# Patient Record
Sex: FEMALE | Race: WHITE | NOT HISPANIC OR LATINO | Employment: FULL TIME | URBAN - METROPOLITAN AREA
[De-identification: names, ages, dates, MRNs, and addresses within clinical notes are randomized per-mention and may not be internally consistent; named-entity substitution may affect disease eponyms.]

---

## 2017-06-19 ENCOUNTER — ALLSCRIPTS OFFICE VISIT (OUTPATIENT)
Dept: OTHER | Facility: OTHER | Age: 37
End: 2017-06-19

## 2017-06-27 ENCOUNTER — LAB CONVERSION - ENCOUNTER (OUTPATIENT)
Dept: OTHER | Facility: OTHER | Age: 37
End: 2017-06-27

## 2017-06-27 LAB
ADDITIONAL INFORMATION (HISTORICAL): NORMAL
ADEQUACY: (HISTORICAL): NORMAL
COMMENT (HISTORICAL): NORMAL
CYTOTECHNOLOGIST: (HISTORICAL): NORMAL
HPV MRNA E6/E7 (HISTORICAL): NOT DETECTED
INTERPRETATION (HISTORICAL): NORMAL
LMP (HISTORICAL): NORMAL
PREV. BX: (HISTORICAL): NORMAL
PREV. PAP (HISTORICAL): NORMAL
REVIEWED BY (HISTORICAL): NORMAL
SOURCE (HISTORICAL): NORMAL

## 2018-01-13 VITALS
SYSTOLIC BLOOD PRESSURE: 130 MMHG | HEIGHT: 64 IN | DIASTOLIC BLOOD PRESSURE: 90 MMHG | BODY MASS INDEX: 30.05 KG/M2 | WEIGHT: 176 LBS

## 2018-08-01 ENCOUNTER — ANNUAL EXAM (OUTPATIENT)
Dept: OBGYN CLINIC | Facility: CLINIC | Age: 38
End: 2018-08-01
Payer: COMMERCIAL

## 2018-08-01 VITALS
SYSTOLIC BLOOD PRESSURE: 140 MMHG | WEIGHT: 194.8 LBS | BODY MASS INDEX: 33.26 KG/M2 | DIASTOLIC BLOOD PRESSURE: 90 MMHG | HEIGHT: 64 IN

## 2018-08-01 DIAGNOSIS — Z01.419 WOMEN'S ANNUAL ROUTINE GYNECOLOGICAL EXAMINATION: Primary | ICD-10-CM

## 2018-08-01 DIAGNOSIS — Z12.39 BREAST CANCER SCREENING: ICD-10-CM

## 2018-08-01 PROCEDURE — 99396 PREV VISIT EST AGE 40-64: CPT | Performed by: OBSTETRICS & GYNECOLOGY

## 2018-08-01 RX ORDER — OMEPRAZOLE 40 MG/1
40 CAPSULE, DELAYED RELEASE ORAL
Refills: 0 | COMMUNITY
Start: 2018-07-08

## 2018-08-01 RX ORDER — CETIRIZINE HYDROCHLORIDE 10 MG/1
TABLET ORAL
COMMUNITY

## 2018-08-01 RX ORDER — ALBUTEROL SULFATE 2.5 MG/3ML
SOLUTION RESPIRATORY (INHALATION)
Refills: 1 | COMMUNITY
Start: 2018-07-07

## 2018-08-01 NOTE — PATIENT INSTRUCTIONS
The patient was informed of a stable gyn examination  Because of a history of severe dysplasia a Pap smear was repeated  She should make arrangements for mammogram   She should return my office 1 year

## 2018-08-01 NOTE — PROGRESS NOTES
This is a 47-year-old white female, she is a  2 para 2 with 2 prior vaginal deliveries  Her current method of contraception includes vasectomy  Her menstrual cycles are regular predictable  She is approximately 3-1/2 years status post cone biopsy for severe dysplasia of the cervix  Her subsequent Pap smears have been normal   She will need a Pap smear today  She is also being treated for recurrent bronchitis  She has been treated with steroids as been weight gain  Her blood pressures also be slightly elevated 140/90  Other medication she is taking a regular basis is Prilosec and zyrtec  The patient denies any problem with depression or anxiety  She sees a dentist on a regular basis  She is concerned about the weight gain prior secondary to steroids  There are no new major family illnesses report this time  Review of systems negative  There is some slight stress incontinence secondary to recurrent bronchitis  Social history negative for tobacco positive for social alcohol      Medical condition significant for recurrent bronchitis  History of GERD,        Surgical history significant for cone biopsy of the cervix in the year       Family history is noncontributory it is positive for hypertension the      Physical exam      This is a well-developed well-nourished female acute distress HEENT is was within normal limits  Cardiac exam shows a regular rhythm and rate normal S1-S2 lungs are clear A&P  There is no evidence bronchitis today  Breast exam symmetrical nontender no masses no dimpling or retraction  Axilla clear bilaterally  Abdominal exam softer no masses positive bowel sounds  Pelvic exam the external genitalia normal limits vagina is clean the uterus is retroverted normal size the cervix is parous  A Pap smear was per performed because of prior history being severe dysplasia  Adnexa clear bilaterally  Impression stable gyn examination    History of severe dysplasia of the cervix  Pap smear was repeated  About the patient started her family physician for borderline blood pressure is 1 40/90  If this Pap smear is normal she return to office in 1 year    She will make arrangements for screening mammogram

## 2018-08-07 LAB
CYTOLOGIST CVX/VAG CYTO: NORMAL
DX ICD CODE: NORMAL
HPV I/H RISK 1 DNA CVX QL PROBE+SIG AMP: NEGATIVE
OTHER STN SPEC: NORMAL
PATH REPORT.FINAL DX SPEC: NORMAL
SL AMB NOTE:: NORMAL
SL AMB SPECIMEN ADEQUACY: NORMAL

## 2019-10-08 ENCOUNTER — ANNUAL EXAM (OUTPATIENT)
Dept: OBGYN CLINIC | Facility: CLINIC | Age: 39
End: 2019-10-08
Payer: COMMERCIAL

## 2019-10-08 VITALS
DIASTOLIC BLOOD PRESSURE: 84 MMHG | WEIGHT: 194.4 LBS | SYSTOLIC BLOOD PRESSURE: 126 MMHG | BODY MASS INDEX: 33.19 KG/M2 | HEIGHT: 64 IN

## 2019-10-08 DIAGNOSIS — Z01.419 WOMEN'S ANNUAL ROUTINE GYNECOLOGICAL EXAMINATION: ICD-10-CM

## 2019-10-08 DIAGNOSIS — Z12.39 BREAST CANCER SCREENING: Primary | ICD-10-CM

## 2019-10-08 PROCEDURE — 99396 PREV VISIT EST AGE 40-64: CPT | Performed by: OBSTETRICS & GYNECOLOGY

## 2019-10-08 RX ORDER — DEXAMETHASONE 4 MG/1
TABLET ORAL
Refills: 3 | COMMUNITY
Start: 2019-07-12

## 2019-10-08 NOTE — PROGRESS NOTES
Assessment/Plan:    The patient was informed of a stable gyn examination  A Pap smear was performed  She does have a history of severe dysplasia in the past   She will make arrangements for mammogram   Return to office in 1 year  She will continue to exercise and lose more weight  Subjective:      Patient ID: Prisca Dick is a 37 y o  female  HPI    This is a 47-year-old white female, she is a  2 para 2 with 2 prior vaginal delivery  She has a history of severe dysplasia of the cervix status post cone biopsy approximately 4 years ago  Her current method of contraception includes vasectomy  Her menstrual cycles are regular and predictable  Does no particular problem with intimacy  She is not happy with her weight  She is to make arrange for mammogram   Denies any problem depression or anxiety  She has a dentist on a regular basis  There are no new major family illnesses report at this time  The following portions of the patient's history were reviewed and updated as appropriate: allergies, current medications, past family history, past medical history, past social history, past surgical history and problem list     Review of Systems   All other systems reviewed and are negative  Objective:      /84   Ht 5' 4" (1 626 m)   Wt 88 2 kg (194 lb 6 4 oz)   LMP 2019 (Approximate)   Breastfeeding? No   BMI 33 37 kg/m²          Physical Exam   Constitutional: She appears well-developed and well-nourished  HENT:   Head: Normocephalic and atraumatic  Eyes: EOM are normal    Neck: Normal range of motion  Neck supple  Cardiovascular: Normal rate, regular rhythm and normal heart sounds  Pulmonary/Chest: Effort normal and breath sounds normal  No stridor  No respiratory distress  She has no wheezes  Right breast exhibits no inverted nipple, no mass, no nipple discharge, no skin change and no tenderness   Left breast exhibits no inverted nipple, no mass, no nipple discharge, no skin change and no tenderness  No breast swelling, tenderness, discharge or bleeding  Breasts are symmetrical    Abdominal: Soft  Bowel sounds are normal  Hernia confirmed negative in the right inguinal area and confirmed negative in the left inguinal area  Genitourinary: Rectum normal, vagina normal and uterus normal  Pelvic exam was performed with patient prone  No labial fusion  There is no rash, tenderness, lesion or injury on the right labia  There is no rash, tenderness, lesion or injury on the left labia  Cervix exhibits no motion tenderness, no discharge and no friability  Right adnexum displays no mass, no tenderness and no fullness  Left adnexum displays no mass, no tenderness and no fullness  No erythema, tenderness or bleeding in the vagina  No foreign body in the vagina  No signs of injury around the vagina  No vaginal discharge found  Genitourinary Comments:  Pap smear was performed  Cervix is consistent with prior cone biopsy of the cervix  Musculoskeletal: Normal range of motion  Lymphadenopathy: No inguinal adenopathy noted on the right or left side  Neurological: She is alert  Skin: Skin is warm and dry  Capillary refill takes less than 2 seconds  Psychiatric: She has a normal mood and affect   Her behavior is normal

## 2019-10-08 NOTE — PATIENT INSTRUCTIONS
Patient was informed of a stable gyn examination  A Pap smear was performed  She does have a history of severe dysplasia  She will continue to try to exercise and lose more weight  She has no other particular gynecological  GI complaint  Return to office in 1 year    She will make arrangements for a mammogram

## 2019-10-11 LAB
CLINICAL INFO: NORMAL
CYTO CVX: NORMAL
CYTOLOGY CMNT CVX/VAG CYTO-IMP: NORMAL
DATE PREVIOUS BX: NORMAL
HPV E6+E7 MRNA CVX QL NAA+PROBE: NOT DETECTED
LMP START DATE: NORMAL
SL AMB PREV. PAP:: NORMAL
SPECIMEN SOURCE CVX/VAG CYTO: NORMAL

## 2021-01-11 ENCOUNTER — ANNUAL EXAM (OUTPATIENT)
Dept: OBGYN CLINIC | Facility: CLINIC | Age: 41
End: 2021-01-11
Payer: COMMERCIAL

## 2021-01-11 VITALS
SYSTOLIC BLOOD PRESSURE: 114 MMHG | HEIGHT: 64 IN | BODY MASS INDEX: 31.65 KG/M2 | WEIGHT: 185.4 LBS | DIASTOLIC BLOOD PRESSURE: 76 MMHG

## 2021-01-11 DIAGNOSIS — Z12.31 ENCOUNTER FOR SCREENING MAMMOGRAM FOR MALIGNANT NEOPLASM OF BREAST: ICD-10-CM

## 2021-01-11 DIAGNOSIS — Z01.419 WOMEN'S ANNUAL ROUTINE GYNECOLOGICAL EXAMINATION: Primary | ICD-10-CM

## 2021-01-11 PROCEDURE — 99396 PREV VISIT EST AGE 40-64: CPT | Performed by: OBSTETRICS & GYNECOLOGY

## 2021-01-11 NOTE — PATIENT INSTRUCTIONS
The patient was informed of a stable gyn examination  A Pap smear was performed  She will continue to get yearly mammograms  Return my office in 1 year

## 2021-01-11 NOTE — PROGRESS NOTES
Assessment/Plan:    The external genitalia normal limits the vagina is clean the patient was informed of a stable gyn examination  A Pap smear was performed because of prior history of JOSE JUAN 3  History of colon biopsy  Otherwise doing well  To continue getting mammograms  She feels safe at home  She has a dentist on a regular basis  She should return my office in 1 year unless this Pap smear is abnormal          Subjective:      Patient ID: Olvin Welch is a 41 y o  female  HPI    This is a 40-year-old white female, she is a  2 para 2 with 2 prior vaginal deliveries  Her current method of contraception includes vasectomy  Her menstrual cycles are regular and predictable  She is still sexually active without issues  She denies any major gynecological  GI complaint  Her mammograms are up-to-date  She would like to lose more weight  Denies any problem with depression or anxiety  She feels safe at home  She is dealing well with COVID  There are no new major family illnesses report at this time  She has a dentist on a regular basis  She is not a smoker  The following portions of the patient's history were reviewed and updated as appropriate: allergies, current medications, past family history, past medical history, past social history, past surgical history and problem list     Review of Systems   All other systems reviewed and are negative  Objective:      /76   Ht 5' 4" (1 626 m)   Wt 84 1 kg (185 lb 6 4 oz)   LMP 2020 (Exact Date)   BMI 31 82 kg/m²          Physical Exam  Vitals signs reviewed  Exam conducted with a chaperone present  Constitutional:       Appearance: Normal appearance  She is normal weight  HENT:      Head: Normocephalic and atraumatic  Eyes:      Extraocular Movements: Extraocular movements intact  Neck:      Musculoskeletal: Normal range of motion and neck supple     Cardiovascular:      Rate and Rhythm: Normal rate and regular rhythm  Pulses: Normal pulses  Heart sounds: Normal heart sounds  Pulmonary:      Effort: Pulmonary effort is normal       Breath sounds: Normal breath sounds  Chest:      Breasts:         Right: Normal  No swelling, bleeding, inverted nipple, mass, nipple discharge or skin change  Left: Normal  No swelling, bleeding, inverted nipple, mass, nipple discharge or skin change  Abdominal:      General: Abdomen is flat  There is no distension  Palpations: Abdomen is soft  There is no hepatomegaly, splenomegaly or mass  Tenderness: There is no abdominal tenderness  There is no right CVA tenderness or left CVA tenderness  Hernia: No hernia is present  There is no hernia in the left inguinal area or right inguinal area  Genitourinary:     General: Normal vulva  Pubic Area: No rash or pubic lice  Labia:         Right: No rash, tenderness, lesion or injury  Left: No rash, tenderness, lesion or injury  Urethra: No prolapse, urethral pain or urethral lesion  Vagina: Normal       Cervix: Normal       Uterus: Normal  Not deviated, not enlarged, not fixed, not tender and no uterine prolapse  Adnexa: Right adnexa normal and left adnexa normal         Right: No mass, tenderness or fullness  Left: No mass, tenderness or fullness  Rectum: Normal  Guaiac result negative  Comments: The external genitalia normal limits the vagina was clean  The cervix is parous but closed  The uterus is anterior normal size there is no cervical motion tenderness  There is no prolapse  The adnexa clear  The cervix shows evidence of prior cone biopsy  A Pap smear was performed  The urethra was normal   There is no prolapse of the bladder  Musculoskeletal: Normal range of motion  Lymphadenopathy:      Upper Body:      Right upper body: No supraclavicular or axillary adenopathy  Left upper body: No supraclavicular or axillary adenopathy        Lower Body: No right inguinal adenopathy  No left inguinal adenopathy  Skin:     General: Skin is warm and dry  Neurological:      General: No focal deficit present  Mental Status: She is alert and oriented to person, place, and time  Psychiatric:         Mood and Affect: Mood normal          Behavior: Behavior normal          Thought Content:  Thought content normal

## 2021-04-08 DIAGNOSIS — Z23 ENCOUNTER FOR IMMUNIZATION: ICD-10-CM

## 2022-02-02 ENCOUNTER — ANNUAL EXAM (OUTPATIENT)
Dept: OBGYN CLINIC | Facility: CLINIC | Age: 42
End: 2022-02-02
Payer: COMMERCIAL

## 2022-02-02 VITALS
BODY MASS INDEX: 33.63 KG/M2 | HEIGHT: 64 IN | WEIGHT: 197 LBS | SYSTOLIC BLOOD PRESSURE: 130 MMHG | DIASTOLIC BLOOD PRESSURE: 86 MMHG

## 2022-02-02 DIAGNOSIS — Z12.31 ENCOUNTER FOR SCREENING MAMMOGRAM FOR MALIGNANT NEOPLASM OF BREAST: ICD-10-CM

## 2022-02-02 DIAGNOSIS — Z01.419 WOMEN'S ANNUAL ROUTINE GYNECOLOGICAL EXAMINATION: Primary | ICD-10-CM

## 2022-02-02 PROCEDURE — 99396 PREV VISIT EST AGE 40-64: CPT | Performed by: OBSTETRICS & GYNECOLOGY

## 2022-02-02 NOTE — PROGRESS NOTES
Assessment/Plan:    The patient was informed of a stable gyn examination  Because of a history of JOSE JUAN 3 a Pap smear was performed  She will continue to monitor her menstrual cycles  She she will continue get yearly mammograms  She is to complete her booster vaccine for COVID  She should return my office in 1 year unless any new changes of significance  Subjective:      Patient ID: Darlyn Dan is a 35 y o  female  HPI    This is a 49-year-old white female, she is a  2 para 2 with 2 prior vaginal deliveries  Her current method of contraception includes vasectomy  Her menstrual cycles are mostly regular predictable  There is no problem with intimacy  She is not happy with her weight  She has a dentist on a regular basis  She feels safe at home  She has gotten 1st dose of COVID vaccine  She has a history of JOSE JUAN 3 is many years status post cone biopsy  Her last 2 Pap smears have been normal   She feels safe at home  There are no new major family illnesses report at this time  She does have a history of asthma which is treated medically and under control  She has been diagnosed with reflux  The following portions of the patient's history were reviewed and updated as appropriate: allergies, current medications, past family history, past medical history, past social history, past surgical history and problem list     Review of Systems   All other systems reviewed and are negative  Objective:      /86   Ht 5' 4" (1 626 m)   Wt 89 4 kg (197 lb)   LMP 2022   BMI 33 81 kg/m²          Physical Exam  Vitals reviewed  Exam conducted with a chaperone present  Constitutional:       Appearance: Normal appearance  HENT:      Head: Normocephalic and atraumatic  Eyes:      Extraocular Movements: Extraocular movements intact  Cardiovascular:      Rate and Rhythm: Normal rate and regular rhythm  Pulses: Normal pulses  Heart sounds: Normal heart sounds  Pulmonary:      Effort: Pulmonary effort is normal  No respiratory distress  Breath sounds: Normal breath sounds  No stridor  No wheezing or rales  Chest:   Breasts:      Right: Normal  No swelling, bleeding, inverted nipple, mass, nipple discharge, skin change, tenderness, axillary adenopathy or supraclavicular adenopathy  Left: No swelling, bleeding, inverted nipple, mass, nipple discharge, skin change, tenderness, axillary adenopathy or supraclavicular adenopathy  Abdominal:      General: Abdomen is flat  Bowel sounds are normal  There is no distension  Palpations: Abdomen is soft  There is no hepatomegaly, splenomegaly or mass  Tenderness: There is no abdominal tenderness  There is no guarding or rebound  Hernia: No hernia is present  There is no hernia in the umbilical area, ventral area, left inguinal area or right inguinal area  Genitourinary:     General: Normal vulva  Pubic Area: No rash or pubic lice  Labia:         Right: No rash, tenderness or lesion  Left: No rash, tenderness or lesion  Urethra: No prolapse, urethral pain, urethral swelling or urethral lesion  Vagina: Normal  No signs of injury and foreign body  No vaginal discharge, erythema, tenderness, bleeding, lesions or prolapsed vaginal walls  Cervix: Normal       Uterus: Normal  Not tender  Adnexa: Right adnexa normal and left adnexa normal         Right: No mass, tenderness or fullness  Left: No mass, tenderness or fullness  Rectum: Normal       Comments: The external genitalia normal limits the vagina is clean the cervix is parous but closed  The cervix is still well status post cone biopsy many years ago  A Pap smear was performed  She has no prolapse of the uterus is anterior no cervical motion tenderness  The there is no prolapse of the bladder or the urethra  Musculoskeletal:         General: Normal range of motion        Cervical back: Normal range of motion and neck supple  Lymphadenopathy:      Upper Body:      Right upper body: No supraclavicular or axillary adenopathy  Left upper body: No supraclavicular or axillary adenopathy  Lower Body: No right inguinal adenopathy  No left inguinal adenopathy  Skin:     General: Skin is warm and dry  Neurological:      General: No focal deficit present  Mental Status: She is alert and oriented to person, place, and time  Psychiatric:         Mood and Affect: Mood normal          Behavior: Behavior normal          Thought Content:  Thought content normal

## 2022-02-02 NOTE — PATIENT INSTRUCTIONS
The patient was informed of a stable gyn examination  She does have a history of JOSE JUAN 3 she is many years status post call biopsy  A Pap smear was performed today  She will make arrangements to get her her booster vaccine for COVID  She will continue get yearly mammograms  She will be able to continue to watch her weight  She should return my office in 1 year unless new problems arise

## 2023-02-07 ENCOUNTER — ANNUAL EXAM (OUTPATIENT)
Dept: OBGYN CLINIC | Facility: CLINIC | Age: 43
End: 2023-02-07

## 2023-02-07 VITALS
HEIGHT: 64 IN | WEIGHT: 196.8 LBS | BODY MASS INDEX: 33.6 KG/M2 | DIASTOLIC BLOOD PRESSURE: 84 MMHG | SYSTOLIC BLOOD PRESSURE: 120 MMHG

## 2023-02-07 DIAGNOSIS — Z01.419 WOMEN'S ANNUAL ROUTINE GYNECOLOGICAL EXAMINATION: ICD-10-CM

## 2023-02-07 DIAGNOSIS — Z12.31 ENCOUNTER FOR SCREENING MAMMOGRAM FOR MALIGNANT NEOPLASM OF BREAST: ICD-10-CM

## 2023-02-07 DIAGNOSIS — N92.0 MENORRHAGIA WITH REGULAR CYCLE: Primary | ICD-10-CM

## 2023-02-07 RX ORDER — TRANEXAMIC ACID 650 MG/1
TABLET ORAL
Qty: 30 TABLET | Refills: 3 | Status: SHIPPED | OUTPATIENT
Start: 2023-02-07

## 2023-02-07 NOTE — PATIENT INSTRUCTIONS
The patient was informed of a stable GYN examination except for slight uterine enlargement possible fibroids and heavy menstrual cycles  Make arrangements for an ultrasound  We will start her on Lysteda to take 5 days each menstrual cycle  She will keep an eye on her anemia  She will call me in 2 months with a progress report  I will inform of results of the ultrasound  She will continue to yearly mammograms  She should return to my office in 1 year everything else is stable

## 2023-02-07 NOTE — PROGRESS NOTES
Assessment/Plan:    The patient was informed of a stable GYN examination  She has a history of JOSE JUAN-3 status post cone biopsy  She also has a history of regular menorrhagia with her menstrual cycles  We will now start her on Lysteda 2 tablets 3 times a day beginning the first day of the menstrual cycle  She will call me in 2 months with a progress report of her bleeding pattern  She has been followed by primary care physician for anemia hopefully to be improvement in anemia  If this is not effective we may consider the IUD  We will also make arrangements for an ultrasound looking for fibroids  We will continue to get yearly mammograms  Start colorectal screening after her 40th birthday  She is content with her weight but she is trying to lose more  She sees a dentist on a regular basis  She feels safe at home  There is no problem with depression or anxiety  Subjective:      Patient ID: Shaan Dowd is a 38 y o  female  HPI  This is a 70-year-old white female, she is a  2 para 2 with 2 prior vaginal deliveries  Her current method of contraception includes vasectomy  Her menstrual cycles are regular and being heavy  Her primary care physician is concerned about increasing problems with anemia with her iron levels dropping  She was instructed to talking about her heavy menstrual flow  After discussion we will start her on Lysteda for 3 months to see if we can diminish the amount of bleeding  She will keep informed of her progress  She is not happy with her weight  She feels safe at home  There is no problem with intimacy  She sees a dentist on a regular basis  She will continue to get yearly mammograms  Her primary care physician spoke to her about starting colorectal evaluation after her 43th birthday  There are no new major family illnesses to report        The following portions of the patient's history were reviewed and updated as appropriate: allergies, current medications, past family history, past medical history, past social history, past surgical history and problem list     Review of Systems   HENT: Negative for sneezing  Genitourinary: Positive for menstrual problem  All other systems reviewed and are negative  Objective:      /84   Ht 5' 4" (1 626 m)   Wt 89 3 kg (196 lb 12 8 oz)   LMP 01/10/2023   BMI 33 78 kg/m²          Physical Exam  Vitals reviewed  Constitutional:       Appearance: Normal appearance  She is normal weight  HENT:      Head: Normocephalic and atraumatic  Nose: Nose normal       Mouth/Throat:      Mouth: Mucous membranes are moist    Eyes:      Extraocular Movements: Extraocular movements intact  Pupils: Pupils are equal, round, and reactive to light  Cardiovascular:      Rate and Rhythm: Normal rate and regular rhythm  Pulmonary:      Effort: Pulmonary effort is normal    Chest:   Breasts:     Breasts are symmetrical       Right: Normal  No swelling, bleeding, inverted nipple, mass, nipple discharge, skin change or tenderness  Left: Normal  No swelling, bleeding, inverted nipple, mass, nipple discharge, skin change or tenderness  Abdominal:      General: Abdomen is flat  Bowel sounds are normal  There is no distension  Palpations: Abdomen is soft  There is no hepatomegaly, splenomegaly or mass  Tenderness: There is no abdominal tenderness  There is no guarding or rebound  Hernia: No hernia is present  There is no hernia in the umbilical area, ventral area, left inguinal area or right inguinal area  Genitourinary:     General: Normal vulva  Pubic Area: No rash or pubic lice  Labia:         Right: No rash, tenderness, lesion or injury  Left: No rash, tenderness, lesion or injury  Urethra: No prolapse, urethral pain, urethral swelling or urethral lesion  Vagina: Normal  No signs of injury and foreign body   No vaginal discharge, erythema, tenderness, bleeding, lesions or prolapsed vaginal walls  Cervix: Normal       Uterus: Normal  Enlarged  Not deviated, not fixed, not tender and no uterine prolapse  Adnexa: Right adnexa normal and left adnexa normal         Right: No mass, tenderness or fullness  Left: No mass, tenderness or fullness  Rectum: Normal       Comments: The external genitalia normal limits the vagina is clean the uterus is top normal size possible fibroid there is no cervical motion tenderness the adnexa clear bilaterally  A Pap smear was performed  There is no evidence of prolapse  The urethra and bladder normal working relationship  Musculoskeletal:         General: Normal range of motion  Cervical back: Normal range of motion and neck supple  Lymphadenopathy:      Upper Body:      Right upper body: No supraclavicular adenopathy  Left upper body: No supraclavicular adenopathy  Lower Body: No right inguinal adenopathy  No left inguinal adenopathy  Skin:     General: Skin is warm and dry  Neurological:      General: No focal deficit present  Mental Status: She is alert and oriented to person, place, and time  Psychiatric:         Mood and Affect: Mood normal          Behavior: Behavior normal          Thought Content:  Thought content normal

## 2024-02-15 ENCOUNTER — ANNUAL EXAM (OUTPATIENT)
Dept: OBGYN CLINIC | Facility: CLINIC | Age: 44
End: 2024-02-15
Payer: COMMERCIAL

## 2024-02-15 VITALS
BODY MASS INDEX: 36.54 KG/M2 | DIASTOLIC BLOOD PRESSURE: 76 MMHG | WEIGHT: 214 LBS | HEIGHT: 64 IN | SYSTOLIC BLOOD PRESSURE: 116 MMHG

## 2024-02-15 DIAGNOSIS — Z12.31 ENCOUNTER FOR SCREENING MAMMOGRAM FOR BREAST CANCER: ICD-10-CM

## 2024-02-15 DIAGNOSIS — Z01.419 WOMEN'S ANNUAL ROUTINE GYNECOLOGICAL EXAMINATION: Primary | ICD-10-CM

## 2024-02-15 PROCEDURE — 99396 PREV VISIT EST AGE 40-64: CPT | Performed by: OBSTETRICS & GYNECOLOGY

## 2024-02-15 RX ORDER — RIMEGEPANT SULFATE 75 MG/75MG
75 TABLET, ORALLY DISINTEGRATING ORAL ONCE
COMMUNITY

## 2024-02-15 RX ORDER — PYRIDOXINE HCL (VITAMIN B6) 100 MG
TABLET ORAL
COMMUNITY

## 2024-02-15 NOTE — PROGRESS NOTES
"Assessment/Plan:    Patient was informed of a stable GYN examination.  A Pap smear was not performed.  She is going to work on her weight.  She sees a dentist on a regular basis.  She feels safe at home.  We will do a Pap smear every other year with her history of JOSE JUAN-3 in the past.  Her last 6 years of Pap smears have been normal.  There are no new major family illnesses to report.  She should return to my office in 1 year.  She has a colonoscopy scheduled for later this year.  She will continue to get yearly mammograms.         Subjective:      Patient ID: Cate Christie is a 43 y.o. female.    HPI    This is a 43-year-old white female, she is a  2 para 2 with 2 prior vaginal deliveries.  Her current method of contraception includes vasectomy.  There is no problem with intimacy.  Her menstrual cycles are not as heavy.  And regular.  Her anemia has improved.  She is not happy with her weight.  She may consider professional help.  Her vital signs are stable.  Currently she is being treated for his asthma and headaches.  She is also taking Prilosec.  There are no new major family illnesses to report.  The patient has a history of JOSE JUAN-3 in the past she is many years status post cone biopsy.  She has had yearly Pap smears in the last 6 years and all been normal.  We will now go to every other year with her Pap smears.  She is in the same relationship for many years.  Her mammogram is up-to-date.  Her colonoscopy is scheduled for later this year.        The following portions of the patient's history were reviewed and updated as appropriate: allergies, current medications, past family history, past medical history, past social history, past surgical history, and problem list.    Review of Systems   All other systems reviewed and are negative.        Objective:      /76   Ht 5' 4\" (1.626 m)   Wt 97.1 kg (214 lb)   LMP 2024 (Exact Date)   BMI 36.73 kg/m²          Physical Exam  Vitals reviewed. " Exam conducted with a chaperone present.   Constitutional:       Appearance: Normal appearance.   HENT:      Head: Normocephalic and atraumatic.      Nose: Nose normal.   Eyes:      Pupils: Pupils are equal, round, and reactive to light.   Cardiovascular:      Rate and Rhythm: Normal rate and regular rhythm.   Pulmonary:      Effort: Pulmonary effort is normal.      Breath sounds: Normal breath sounds.   Chest:   Breasts:     Breasts are symmetrical.      Right: Normal.      Left: Normal.   Abdominal:      General: Abdomen is flat. Bowel sounds are normal.      Palpations: Abdomen is soft. There is no hepatomegaly or splenomegaly.      Hernia: No hernia is present. There is no hernia in the left inguinal area or right inguinal area.   Genitourinary:     General: Normal vulva.      Pubic Area: No rash or pubic lice.       Labia:         Right: No rash, tenderness, lesion or injury.         Left: No rash, tenderness, lesion or injury.       Urethra: No prolapse, urethral pain, urethral swelling or urethral lesion.      Vagina: Normal. No signs of injury and foreign body. No vaginal discharge, erythema, tenderness, bleeding, lesions or prolapsed vaginal walls.      Cervix: Normal.      Uterus: Normal.       Adnexa: Right adnexa normal and left adnexa normal.      Rectum: Normal.      Comments: The external genitalia normal limits the vagina is clean.  The cervix parous but closed.  There is no cervical motion tenderness.  A Pap smear was not done this year.  There is no evidence of prolapse.  The urethra and bladder normal working relationship.  Musculoskeletal:         General: Normal range of motion.      Cervical back: Normal range of motion and neck supple.   Lymphadenopathy:      Upper Body:      Right upper body: No supraclavicular adenopathy.      Left upper body: No supraclavicular adenopathy.   Skin:     General: Skin is warm.   Neurological:      General: No focal deficit present.      Mental Status: She is  alert.   Psychiatric:         Mood and Affect: Mood normal.

## 2024-02-15 NOTE — PATIENT INSTRUCTIONS
The patient was informed of a stable GYN examination.  A Pap smear was not performed.  She is cleared for colonoscopy this year.  She will continue get yearly mammograms.  She is considering professional help with weight loss.  She should return to my office in 1 year.  She should get a Pap smear every other year

## 2025-04-22 ENCOUNTER — ANNUAL EXAM (OUTPATIENT)
Dept: OBGYN CLINIC | Facility: CLINIC | Age: 45
End: 2025-04-22
Payer: COMMERCIAL

## 2025-04-22 VITALS — SYSTOLIC BLOOD PRESSURE: 122 MMHG | DIASTOLIC BLOOD PRESSURE: 74 MMHG | BODY MASS INDEX: 29.35 KG/M2 | WEIGHT: 171 LBS

## 2025-04-22 DIAGNOSIS — Z12.31 ENCOUNTER FOR SCREENING MAMMOGRAM FOR BREAST CANCER: ICD-10-CM

## 2025-04-22 DIAGNOSIS — Z01.419 WOMEN'S ANNUAL ROUTINE GYNECOLOGICAL EXAMINATION: Primary | ICD-10-CM

## 2025-04-22 PROCEDURE — 99396 PREV VISIT EST AGE 40-64: CPT | Performed by: OBSTETRICS & GYNECOLOGY

## 2025-04-22 RX ORDER — TIRZEPATIDE 2.5 MG/.5ML
INJECTION, SOLUTION SUBCUTANEOUS
COMMUNITY
Start: 2025-04-07

## 2025-04-22 RX ORDER — PROPRANOLOL HCL 20 MG
20 TABLET ORAL DAILY PRN
COMMUNITY
Start: 2025-03-27

## 2025-04-22 NOTE — PROGRESS NOTES
Name: Cate Christie      : 1980      MRN: [id number]  Encounter Provider: Derek Benton MD  Encounter Date: 2025   Encounter department: [facility]  :  Assessment & Plan  Encounter for screening mammogram for breast cancer    Orders:    Mammo screening bilateral w 3d and cad; Future    Women's annual routine gynecological examination  The patient was informed of a stable GYN examination.  A Pap smear was performed.  She is happy with the way.  She feels safe at home.  She sees a dentist on a regular basis.  Is no prior depression or anxiety.  Colonoscopies and mammograms up-to-date.           History of Present Illness   HPI  Cate Christie is a 44 y.o. female who presents for her yearly exam.  She is a  2 para 2 with 2 prior vaginal deliveries.  Her current method of contraception includes a vasectomy.  Her menstrual cycles are regular and predictable.  She feels safe at home.  She is content with her weight.  She did use semaglutide's and has lost over 30 pounds.  She is currently on Zepbound.  This is working well.  There is no problem with intimacy.  She will continue getting her yearly mammograms.  Colonoscopy is up-to-date she goes every 5 years.  She has a history of severe dysplasia of the cervix underwent a cone biopsy of cervix in the year .  Her subsequent Pap smears to be normal she will do a Pap smear today.  Denies any prior depression or anxiety.  Her PHQ score today is 0.  There is no major  or GI complaint.  There are no new major family illnesses to report.  She will continue to self breast examinations.      Review of Systems   All other systems reviewed and are negative.         Objective   /74   Wt 77.6 kg (171 lb)   LMP 2025 (Approximate)   BMI 29.35 kg/m²      Physical Exam  Vitals reviewed. Exam conducted with a chaperone present.   Constitutional:       Appearance: Normal appearance. She is normal weight.   HENT:      Head:  Normocephalic and atraumatic.      Right Ear: Tympanic membrane normal.   Cardiovascular:      Rate and Rhythm: Normal rate and regular rhythm.      Pulses: Normal pulses.      Heart sounds: Normal heart sounds.   Pulmonary:      Effort: Pulmonary effort is normal.      Breath sounds: Normal breath sounds.   Chest:   Breasts:     Right: Normal.      Left: Normal.   Abdominal:      General: Abdomen is flat. Bowel sounds are normal.      Palpations: Abdomen is soft. There is no shifting dullness, fluid wave, hepatomegaly or splenomegaly.      Tenderness: There is no abdominal tenderness. There is no right CVA tenderness or left CVA tenderness.      Hernia: There is no hernia in the left inguinal area or right inguinal area.   Genitourinary:     General: Normal vulva.      Pubic Area: No rash or pubic lice.       Labia:         Right: No rash, tenderness, lesion or injury.         Left: No rash, tenderness, lesion or injury.       Urethra: No prolapse, urethral pain, urethral swelling or urethral lesion.      Vagina: Normal.      Cervix: Normal.      Uterus: Normal.       Adnexa: Right adnexa normal and left adnexa normal.      Rectum: Normal.      Comments: The external genitalia normal limits the vagina is clean cervix parous to closed uterus is anterior normal size.  A Pap smear was performed.  There is no evidence of prolapse.  There is no cervical motion tenderness.  The recent bladder normal working relationship.  Musculoskeletal:         General: Normal range of motion.      Cervical back: Normal range of motion and neck supple.   Lymphadenopathy:      Lower Body: No right inguinal adenopathy. No left inguinal adenopathy.   Skin:     General: Skin is warm.   Neurological:      General: No focal deficit present.      Mental Status: She is alert and oriented to person, place, and time.   Psychiatric:         Mood and Affect: Mood normal.

## 2025-04-22 NOTE — PATIENT INSTRUCTIONS
The patient was informed of a stable GYN examination.  A Pap smear was performed.  She is happy with her weight.  She feels safe at home.  Colonoscopies and mammograms up-to-date.  There is no signs of menopause.  Is no prior depression or anxiety.  She should return to my office in 1 year unless new issues or problems occur.

## 2025-04-25 LAB
CLINICAL INFO: NORMAL
CYTO CVX: NORMAL
CYTOLOGY CMNT CVX/VAG CYTO-IMP: NORMAL
DATE PREVIOUS BX: NORMAL
LMP START DATE: NORMAL
SL AMB PREV. PAP:: NORMAL
SPECIMEN SOURCE CVX/VAG CYTO: NORMAL